# Patient Record
(demographics unavailable — no encounter records)

---

## 2018-04-04 NOTE — DIAGNOSTIC IMAGING REPORT
PROCEDURE:CHEST SINGLE (PORTABLE)

TECHNIQUE:Portable AP chest

INDICATION:Right arm weakness

COMPARISON:[facility], DX, CHEST SINGLE (PORTABLE), 

1/06/2016, 10:28.

 

FINDINGS:

Lungs are clear and symmetrically inflated. No pleural effusions. 

Normal heart size, mediastinal contour, and pulmonary vasculature for 

technique. Intact skeleton.

 

 

CONCLUSION:

No acute abnormality.

 

 

 

Dictated by:  Nico Anderson M.D. on 4/04/2018 at 12:12     

Electronically approved by:  Nico Anderson M.D. on 4/04/2018 at 

12:12

## 2018-04-04 NOTE — DIAGNOSTIC IMAGING REPORT
Exam: Head CT without contrast

History:  Right arm weakness

Comparison studies:  Multiple brain MRIs and head CTs which date to the most

remote head CT of 9/6/2015 and most recent head CT of 1/6/2016.



Technique:

Axial images were obtained from the skull base to the vertex.

Coronal and sagittal images reconstructed from the axial data.

Intravenous contrast: None



Findings:



Scalp: No abnormalities.

Bones: No fractures, blastic or lytic lesions.



Brain sulci: Appropriate for age.

Ventricles: Normal in size and configuration. No hydrocephalus.

Extra-axial spaces: No mass or acute hemorrhage. Mildly prominent extra axial

spaces of CSF density along the bifrontal convexities may be related to mild

age-appropriate generalized volume loss or less likely small subdural hygromas

without mass effect.



Parenchyma: 

Chronic insult centered along the left postcentral gyrus was acute in 12/2015.

Additional chronic insult with encephalomalacia centered along the anterior

left frontal operculum is not well visualized on the CT of 1/6/2016. No masses,

acute hemorrhage, or acute cortical vascular insults.



Sellar/suprasellar region: No abnormalities.

Craniocervical junction: Patent foramen magnum. No Chiari one malformation.



Incidental findings: 

Atherosclerotic calcifications in the carotid siphons.



IMPRESSION:

 

1.  No acute intracranial abnormalities.

2.  Chronic insults in the left postcentral gyrus and left frontal operculum.



If there remains persistent concern for acute on chronic ischemia, brain MRI

may further evaluate.



Signed by: Dr. Zbigniew Rush M.D. on 4/4/2018 11:35 AM

## 2018-04-04 NOTE — XMS REPORT
Patient Summary Document

 Created on: 2018



STACEY RODRIGUEZ

External Reference #: [id number]

: 1943

Sex: Male



Demographics







 Address  [address] [place] [address]

 

 Home Phone  [phone]

 

 Preferred Language  Unknown

 

 Marital Status  Unknown

 

 Adventism Affiliation  Unknown

 

 Race  Unknown

 

 Additional Race(s)  

 

 Ethnic Group  Unknown





Author







 [facility]  [facility]

 

 [facility]

 

 Address  Unknown

 

 Phone  Unavailable







Care Team Providers







 Care Team Member Name  Role  Phone

 

 JAD GARCÍA  Unavailable  Unavailable







Problems

This patient has no known problems.



Allergies, Adverse Reactions, Alerts

This patient has no known allergies or adverse reactions.



Medications

This patient has no known medications.



Results







 Test Description  Test Time  Test Comments  Text Results  Atomic Results  
Result Comments









 CHEST SINGLE (PORTABLE)            [place] [address]      Patient Name: STACEY RODRIGUEZ   MR #: [id number]    : 1943 Age/Sex: 74/M  Acct #: [id number] 
Req #: [id number]  Adm Physician:     Ordered by: PETER COVINGTON NP  Report #: 
[id number]   Location: ER  Room/Bed:     ________________________________________
___________________________________________________________    Procedure: [id number]
[id number] DX/CHEST SINGLE (PORTABLE)  Exam Date:                             Exam 
Time:        REPORT STATUS: Signed    PROCEDURE:   CHEST SINGLE (PORTABLE)   
TECHNIQUE:   Portable AP chest   INDICATION:   Right arm weakness   COMPARISON:
   [facility], DX, CHEST SINGLE (PORTABLE),    2016, 10:28.  
     FINDINGS:   Lungs are clear and symmetrically inflated. No pleural 
effusions.    Normal heart size, mediastinal contour, and pulmonary vasculature 
for    technique. Intact skeleton.           CONCLUSION:   No acute 
abnormality.               Dictated by:  Mishel Anderson M.D. on 2018 
at 12:12        Electronically approved by:  Mishel Anderson M.D. on 2018 at    12:12                Dictated By: MISHEL ANDERSON MD  Electronically 
Signed By: MISHEL ANDERSON MD on 18 1212  Transcribed By: BENJAMIN on  1212       COPY TO:   PETER COVINGTON NP           

 

 CT BRAIN WO            [place] [address]      Patient Name: STACEY RODRIGUEZ   MR #
: [id number]    : 1943 Age/Sex: 74/M  Acct #: [id number] Req #: [id number]
[id number]  Adm Physician:     Ordered by: JAD GARCÍA MD  Report #: [id number]
[id number]   Location: ER  Room/Bed:     _____________________________________________
______________________________________________________    Procedure: [id number] 
CT/CT BRAIN WO  Exam Date:                             Exam Time:        REPORT 
STATUS: Signed    Exam: Head CT without contrast   History:  Right arm weakness
   Comparison studies:  Multiple brain MRIs and head CTs which date to the most
   remote head CT of 2015 and most recent head CT of 2016.      
Technique:   Axial images were obtained from the skull base to the vertex.   
Coronal and sagittal images reconstructed from the axial data.   Intravenous 
contrast: None      Findings:      Scalp: No abnormalities.   Bones: No 
fractures, blastic or lytic lesions.      Brain sulci: Appropriate for age.   
Ventricles: Normal in size and configuration. No hydrocephalus.   Extra-axial 
spaces: No mass or acute hemorrhage. Mildly prominent extra axial   spaces of 
CSF density along the bifrontal convexities may be related to mild   age-
appropriate generalized volume loss or less likely small subdural hygromas   
without mass effect.      Parenchyma:    Chronic insult centered along the left 
postcentral gyrus was acute in 2015.   Additional chronic insult with 
encephalomalacia centered along the anterior   left frontal operculum is not 
well visualized on the CT of 2016. No masses,   acute hemorrhage, or acute 
cortical vascular insults.      Sellar/suprasellar region: No abnormalities.   
Craniocervical junction: Patent foramen magnum. No Chiari one malformation.    
  Incidental findings:    Atherosclerotic calcifications in the carotid 
siphons.      IMPRESSION:       1.  No acute intracranial abnormalities.   2.  
Chronic insults in the left postcentral gyrus and left frontal operculum.      
If there remains persistent concern for acute on chronic ischemia, brain MRI   
may further evaluate.      Signed by: Dr. Ramesh Rush M.D. on 2018 11:35 
AM        Dictated By: RAMESH RUSH MD  Electronically Signed By: RAMESH RUSH MD on 18 1135  Transcribed By: MARILYN on 18 1135       
COPY TO:   JAD GARCÍA MD

## 2018-04-04 NOTE — CONSULTATION
DATE OF CONSULTATION:  2018 



HISTORY OF PRESENT ILLNESS: Mr. Katz is a 74-year-old right hand dominant 

man with past medical history significant for hypertension, hyperlipidemia, 

and two prior strokes with residual dysarthria, mild expressive aphasia, 

and mild weakness and numbness of the right hand who presented to [facility] on 

2018 after experiencing transient neurological deficits effecting 

the right hand. 



On the morning of admission, the patient was rinsing his mouth with 

mouthwash.  As he reached for a towel with his right hand, he noticed the 
sudden 

onset of weakness of his  and dropped the towel to the floor.  When he 

attempted to pick the towel up from the floor with his right hand, he was 

unable to do so.  Finally, he retrieved the towel with his left hand.  In 

addition to the weakness of his right hand, the patient endorses loss of 

sensation of the fingers of  the right hand. However, this is a residual 

deficit from his last stroke which occurred approximately 2 and 1/2 years ago.  

Mr. Katz does not report any new or worsening visual field cut or other 

disturbance, dysarthria, aphasia, facial droop, gait or 

balance impairment, dizziness or confusion associated with the above 

symptoms.



As these symptoms occurred, Mr. Katz felt like "my blood pressure was 

high." His wife, who witnessed the symptoms, contacted emergency medical 

services.  When paramedics arrived on the scene and checked Mr. Katz's 

blood pressure, his systolic blood pressure was approximately 205 mmHg.  

The patient was placed in the ambulance and transported to the emergency 

center at [facility] for further evaluation.



Upon arrival in the emergency center, the patient's blood pressure was 

170/71 mmHg with a pulse of 53 beats per minute.  Mr. Katz continued to 

have weakness and clumsiness of the right hand, but it improved over 

several minutes until the patient returned to his neurological baseline.  A CT 
of

the brain without contrast was performed, but did not show evidence of recent

ischemia or hemorrhage.  Two remote ischemic strokes in the left frontal 

operculum and left precentral gyrus were observed.



Mr. Katz was admitted to [facility] under observation 

status for further evaluation and treatment of the symptoms described 

above.



REVIEW OF SYSTEMS:  Joint pain, residual dysarthria from a prior stroke, 

residual weakness of the right hand from a prior stroke, residual numbness 

of the right hand from a prior stroke.

Otherwise, a 12 point review of systems was negative.



PAST MEDICAL HISTORY:  Hypertension, hyperlipidemia, depression, two prior 

strokes with  residual dysarthria, mild expressive aphasia, and mild 

weakness and numbness of the right hand, gout. 



PAST SURGICAL HISTORY:  The patient denies prior surgical history.



PAST HOSPITALIZATIONS:  Stroke times two, allergic reaction to medications.



FAMILY HISTORY:  The patient's paternal and maternal grandparents are 

.  Their medical histories not known.  Patient's father is  

from cancer.  He had coronary artery disease as well.  The patient's mother 

is living.  She has coronary artery disease.  The patient has one brother 

who is  from cancer.  A second brother has experienced transient 

ischemic attacks.  His sister is healthy.  Mr. Katz has 2 children, one 

son and one daughter.  His son have hypertension.  His daughter is healthy.



SOCIAL HISTORY:  The patient is .  He graduated high school and 

attended some college.  Mr. Katz is retired at present.  He previously 

worked as a /draftsman. The patient does not report current or 

prior tobacco, alcohol, or recreational drug use.



HOME MEDICATIONS:  Aspirin 81 mg by mouth daily.  Plavix 75 mg by mouth 

daily.  Bystolic 10 milligrams by mouth daily.  Atorvastatin 20 mg by mouth 

at bedtime daily.



ALLERGIES:  PENICILLIN, ALLOPURINOL, AZELASTINE, HYDRALAZINE, LISINOPRIL, 

VALSARTAN.  THE PATIENT IS ALLERGIC TO SHRIMP.  THERE IS NO KNOWN LATEX 

ALLERGY.  NO KNOWN ALLERGY TO IODINE OR OTHER CONTRAST MATERIALS.  



PHYSICAL EXAMINATION:   

VITAL SIGNS:  Height 71 inches.  Weight 170 pounds.  BMI 23.7 kilograms per 

meter squared.  Blood pressure 158/73 mmHg.  Pulse 57 beats per minute.  

Respiratory rate 20 breaths per minute.  Oxygen saturation 100% on room 

air.  

GENERAL:  The patient is awake and alert.  Does not appear distressed.  

HEENT:  Normocephalic and atraumatic.  Pupils are equal, round and reactive 

to light.  Moist mucous membranes.  

NECK:  Supple.  No appreciable thyromegaly.  No appreciable carotid bruits. 

CARDIOVASCULAR:  S1 and S2.  Regular rate and rhythm.  No murmurs, rubs or 

gallops.  

RESPIRATORY:  Clear to auscultation bilaterally.  No wheezes, rhonchi or 

rales.

EXTREMITIES:  The skin is dry but cool to the touch, especially over the 

arms and hands.  No clubbing, cyanosis or edema.  The posterior tibial and 

dorsalis pedis pulses are 2+ and symmetric.

SKIN:  No rashes or lesions.  

NEUROLOGIC:  Memory/attention:  The patient is awake and alert.  Oriented 

to person, place, time, and situation.  

CRANIAL NERVES:  Cranial nerve I:  Not tested.  Cranial nerves II, III, IV, 

VI:  Pupils are equal and round, react briskly to light (from 4 mm to 2 

mm).  Extraocular movements intact.  No nystagmus.  Cranial nerve V:  

Sensation to light touch and pinprick is intact in the bilateral V1 through 

V3 distributions.  Strength of the temporalis and masseter muscles is 

within normal limits.  Cranial nerve VII:  The face is symmetric, as are 

all facial movements.  Strength is within normal limits.  Cranial nerve 

VIII:  Hearing is diminished to finger rub bilaterally.  Cranial nerve IX 

and X:  The soft palate elevates equally and symmetrically.  Cranial nerve 

XI:  Normal strength of the bilateral sternocleidomastoid and trapezius 

muscles.  Cranial nerve XII:  The tongue protrudes midline and moves 

symmetrically from side to side.  

STRENGTH:  Bulk is normal, and strength is 5/5 in the bilateral deltoids, 

biceps, triceps, wrist flexors and extensors, finger flexors and extensors, 

intrinsic hand muscles, hip flexors, knee flexors and extensors, ankle 

dorsiflexion and plantar flexion, and intrinsic foot muscles except as 

follows:  The right 2nd and 3rd finger flexors and right adductor pollicis 

brevis muscles are 4/5.  Tone is normal.  

DTRs:  Deep tendon reflexes are 3+ and symmetric at the bilateral triceps, 

biceps, brachioradialis, patellas, and Achilles.  Plantar responses are 

flexor bilaterally.  Absent clonus.  

SENSATION:  Intact to light touch and pinprick in both arms and both legs.  

CEREBELLAR:  Finger-nose-finger and heel-shin movements are intact without 

dysmetria or other impairment.  Rapid alternating movements are impaired in 

the right hand. 

GAIT:  Deferred.

SPEECH:  Spontaneous speech is mildly dysarthric without aphasia.  

Repetition is intact.  

INVOLUNTARY MOVEMENTS:  None.  

PRONATOR DRIFT:  None.

 

LABORATORY DATA:  Sodium 141, potassium 4.3, chloride 109, carbon dioxide 

26, anion gap 10.3.  BUN 15, creatinine 0.97.  Estimated GFR greater than 

60.  BUN to creatinine ratio 15. Glucose 91.  Calcium 11.1.  Magnesium 1.7. 

 Total bilirubin 0.5.  AST 16, ALT 23.  Alkaline phosphatase 82.  Total 

protein 6.9.  Albumin 3.8.  Globulin 3.1 and albumin to globulin ratio 1.2. 

Creatinine kinase 41.  CK MB 1.30.  Troponin I less than 0.001.  CBC with 

differential and platelets reveals a white blood cell count of 10.02 with a 

normal differential.  The hemoglobin and hematocrit of 13.8 and 40.6, 

respectively.  Platelet count 252,000. PT 12.8.  INR 1.04.  PTT 27.7.  

Urinalysis shows 1+ ketones and trace blood.  Otherwise unremarkable.



DIAGNOSTIC STUDIES:  Chest x-ray on 2018:  No acute abnormality.  CT 

of the brain without contrast on 2018:  There are remote ischemic 

strokes in the left post central gyrus and left frontal operculum.   There 

is no evidence of recent large territorial ischemia, hemorrhage, mass or 

mass effect.  EKG in 2018:  Sinus bradycardia at 51 beats per minute. 



ASSESSMENT AND PLAN:  Mr. Katz is a 74-year-old right hand dominant man 

with multiple vascular risk factors as detailed above who presented to 

[facility] on 2018 with transient neurological 

symptoms affecting his right hand.  While in the  emergency center, the 

symptoms resolved over a period of several minutes until the patient had 

returned to his neurological baseline.  Currently, the patient's 

neurological examination is significant for mild weakness of the right 

second and third finger flexor and right abductor pollicis brevis muscles, 

impaired rapid alternating movements of the right and, and mildly 

dysarthric speech.  Mr. Katz reports these are residual deficits from his 

most recent stroke  2 and 1/2 years ago.  The patient's laboratory data and 

diagnostic studies have been reviewed and are documented above.



It is possible the symptoms described in the history of present illness 

represent a transient ischemic attack.  Another possible diagnosis is 

recrudescence of prior deficits secondary to hypertensive urgency/emergency. 

 When assessed by paramedics at his home, the patient had a markedly 

elevated systolic blood pressure at 205 mmHg.  With elevations of systolic 

blood pressure, residual symptoms from prior vascular insults to the brain 

may become more prominent.  A third, though less likely, diagnosis is 

mononeuropathy at the right wrist (i.e. right carpal tunnel syndrome).



Given the patient's multiple vascular risk factors, including two prior 

strokes, a diagnosis of transient ischemic attack is most probable.



RECOMMENDATIONS:  

1. Lipid panel and hemoglobin A1c.  

2. MRI of the brain without contrast.

3. Echocardiogram.  

4. Bilateral carotid artery ultrasound with Doppler.  

5. Aspirin 81 mg by mouth daily for stroke prophylaxis for the time 

being.  

6. Allow permissive hypertension.  Patient's goal blood pressure is 160 

to 180  mmHg systolic over 70 to 90 mmHg diastolic.  

7. Continue Lipitor 20 mg by mouth at bedtime for treatment 

hyperlipidemia for now. 

8. Speech and physical therapy consultations will be ordered.  

9. Mr. Katz will be prescribed Pepcid 20 milligrams by mouth daily for 

GI prophylaxis.  He will be prescribed Lovenox 40 mg subcutaneously 

daily for DVT prophylaxis.  

10. For treatment of the remaining medical comorbidities to primary and 

other services.  



 



Thank you for this consultation.  I will continue to follow this patient 

while he remains in the hospital.  



TIME SPENT:  70 minutes.  





DD:  2018 17:22

DT:  2018 18:33

Job#:  [id number]



MTDD

## 2018-04-05 NOTE — DIAGNOSTIC IMAGING REPORT
Exam: Brain MRI without IV contrast



History: TIA, possible stroke, right-sided weakness

Comparison studies: Multiple prior head CTs and brain MRIs which date to the

most remote head CT of 9/6/2015, most recent head CT of 4/4/2018.



Technique: 

Sagittal and axial T2 FS, axial T1 FLAIR, axial T2*GRE, axial DWI and axial

coronal T2 FLAIR.

Intravenous contrast: None



Findings:



Several pulse sequences are somewhat limited by artifacts related to patient

motion.



Scalp: Normal in signal. No masses.

Bone marrow: Normal in signal intensity.



Brain sulci: Appropriate for age.

Ventricles: Normal in size. No hydrocephalus.

Extra axial spaces: No mass, no fluid collection.



Parenchyma:

There is a 4 mm focus of increased DWI signal regional to the "hand knob" of

the left precentral gyrus which is compatible with an acute ischemic insult in

this patient who presents with right-sided hand weakness.



This insult is adjacent to small chronic insult in the left postcentral gyrus

with cortical/subcortical encephalomalacia and gliosis. Additional small

chronic cortical-subcortical center within the left frontal operculum is

unchanged. There is a questionable small chronic insult in the left

posteriolateral temporal lobe which is not well visualized by CT and was not

visualized on the remote brain MRI of 12/24/2015 (series 8, image 9 and series

4, image 12).



Suprasellar region: No abnormalities.

Craniocervical junction: Patent foramen magnum.  No Chiari malformation 

Vessels: Normal flow-voids in the arteries and sinuses.



IMPRESSION:



1.  Small nonhemorrhagic acute ischemic insult in the left precentral gyrus

would account for patient's presenting symptom of right hand weakness.



2.  Unchanged small chronic insults in the left postcentral gyrus and left

frontal operculum with questionable smaller chronic insult in the

posterolateral left temporal lobe in the left MCA territory.



Findings were discussed with Dr. Sky at 9:50 AM on 4/5/2018.







Signed by: Dr. Zbigniew Rush M.D. on 4/5/2018 9:59 AM